# Patient Record
Sex: MALE
[De-identification: names, ages, dates, MRNs, and addresses within clinical notes are randomized per-mention and may not be internally consistent; named-entity substitution may affect disease eponyms.]

---

## 2019-04-18 ENCOUNTER — RX ONLY (OUTPATIENT)
Age: 54
Setting detail: RX ONLY
End: 2019-04-18

## 2020-02-18 ENCOUNTER — RX ONLY (OUTPATIENT)
Age: 55
Setting detail: RX ONLY
End: 2020-02-18

## 2020-07-21 ENCOUNTER — RX ONLY (OUTPATIENT)
Age: 55
Setting detail: RX ONLY
End: 2020-07-21

## 2020-07-31 ENCOUNTER — RX ONLY (OUTPATIENT)
Age: 55
Setting detail: RX ONLY
End: 2020-07-31

## 2020-09-02 ENCOUNTER — APPOINTMENT (RX ONLY)
Dept: URBAN - METROPOLITAN AREA CLINIC 158 | Facility: CLINIC | Age: 55
Setting detail: DERMATOLOGY
End: 2020-09-02

## 2020-09-02 DIAGNOSIS — L20.89 OTHER ATOPIC DERMATITIS: ICD-10-CM | Status: STABLE

## 2020-09-02 DIAGNOSIS — L28.1 PRURIGO NODULARIS: ICD-10-CM | Status: STABLE

## 2020-09-02 DIAGNOSIS — L91.8 OTHER HYPERTROPHIC DISORDERS OF THE SKIN: ICD-10-CM

## 2020-09-02 PROBLEM — H54.415A BLINDNESS RIGHT EYE CATEGORY 5, NORMAL VISION LEFT EYE: Status: ACTIVE | Noted: 2020-09-02

## 2020-09-02 PROBLEM — N40.1 BENIGN PROSTATIC HYPERPLASIA WITH LOWER URINARY TRACT SYMPTOMS: Status: ACTIVE | Noted: 2020-09-02

## 2020-09-02 PROBLEM — I67.1 CEREBRAL ANEURYSM, NONRUPTURED: Status: ACTIVE | Noted: 2020-09-02

## 2020-09-02 PROBLEM — Z00.01 ENCOUNTER FOR GENERAL ADULT MEDICAL EXAMINATION WITH ABNORMAL FINDINGS: Status: ACTIVE | Noted: 2020-09-02

## 2020-09-02 PROBLEM — Z91.19 PATIENT'S NONCOMPLIANCE WITH OTHER MEDICAL TREATMENT AND REGIMEN: Status: ACTIVE | Noted: 2020-09-02

## 2020-09-02 PROBLEM — Z28.21 IMMUNIZATION NOT CARRIED OUT BECAUSE OF PATIENT REFUSAL: Status: ACTIVE | Noted: 2020-09-02

## 2020-09-02 PROBLEM — Z00.00 ENCOUNTER FOR GENERAL ADULT MEDICAL EXAMINATION WITHOUT ABNORMAL FINDINGS: Status: ACTIVE | Noted: 2020-09-02

## 2020-09-02 PROBLEM — D53.9 NUTRITIONAL ANEMIA, UNSPECIFIED: Status: ACTIVE | Noted: 2020-09-02

## 2020-09-02 PROBLEM — H50.9 UNSPECIFIED STRABISMUS: Status: ACTIVE | Noted: 2020-09-02

## 2020-09-02 PROBLEM — E55.9 VITAMIN D DEFICIENCY, UNSPECIFIED: Status: ACTIVE | Noted: 2020-09-02

## 2020-09-02 PROBLEM — R35.1 NOCTURIA: Status: ACTIVE | Noted: 2020-09-02

## 2020-09-02 PROBLEM — I12.9 HYPERTENSIVE CHRONIC KIDNEY DISEASE WITH STAGE 1 THROUGH STAGE 4 CHRONIC KIDNEY DISEASE, OR UNSPECIFIED CHRONIC KIDNEY DISEASE: Status: ACTIVE | Noted: 2020-09-02

## 2020-09-02 PROBLEM — M13.0 POLYARTHRITIS, UNSPECIFIED: Status: ACTIVE | Noted: 2020-09-02

## 2020-09-02 PROBLEM — E34.9 ENDOCRINE DISORDER, UNSPECIFIED: Status: ACTIVE | Noted: 2020-09-02

## 2020-09-02 PROBLEM — E66.01 MORBID (SEVERE) OBESITY DUE TO EXCESS CALORIES: Status: ACTIVE | Noted: 2020-09-02

## 2020-09-02 PROBLEM — K58.0 IRRITABLE BOWEL SYNDROME WITH DIARRHEA: Status: ACTIVE | Noted: 2020-09-02

## 2020-09-02 PROBLEM — F43.20 ADJUSTMENT DISORDER, UNSPECIFIED: Status: ACTIVE | Noted: 2020-09-02

## 2020-09-02 PROBLEM — K63.5 POLYP OF COLON: Status: ACTIVE | Noted: 2020-09-02

## 2020-09-02 PROBLEM — N18.3 CHRONIC KIDNEY DISEASE, STAGE 3 (MODERATE): Status: ACTIVE | Noted: 2020-09-02

## 2020-09-02 PROBLEM — N52.9 MALE ERECTILE DYSFUNCTION, UNSPECIFIED: Status: ACTIVE | Noted: 2020-09-02

## 2020-09-02 PROBLEM — M25.561 PAIN IN RIGHT KNEE: Status: ACTIVE | Noted: 2020-09-02

## 2020-09-02 PROBLEM — E79.0 HYPERURICEMIA WITHOUT SIGNS OF INFLAMMATORY ARTHRITIS AND TOPHACEOUS DISEASE: Status: ACTIVE | Noted: 2020-09-02

## 2020-09-02 PROBLEM — E78.00 PURE HYPERCHOLESTEROLEMIA, UNSPECIFIED: Status: ACTIVE | Noted: 2020-09-02

## 2020-09-02 PROBLEM — E53.8 DEFICIENCY OF OTHER SPECIFIED B GROUP VITAMINS: Status: ACTIVE | Noted: 2020-09-02

## 2020-09-02 PROBLEM — R25.2 CRAMP AND SPASM: Status: ACTIVE | Noted: 2020-09-02

## 2020-09-02 PROBLEM — B35.4 TINEA CORPORIS: Status: ACTIVE | Noted: 2020-09-02

## 2020-09-02 PROBLEM — R53.81 OTHER MALAISE: Status: ACTIVE | Noted: 2020-09-02

## 2020-09-02 PROBLEM — L20.84 INTRINSIC (ALLERGIC) ECZEMA: Status: ACTIVE | Noted: 2020-09-02

## 2020-09-02 PROBLEM — E78.2 MIXED HYPERLIPIDEMIA: Status: ACTIVE | Noted: 2020-09-02

## 2020-09-02 PROBLEM — H34.11 CENTRAL RETINAL ARTERY OCCLUSION, RIGHT EYE: Status: ACTIVE | Noted: 2020-09-02

## 2020-09-02 PROCEDURE — ? PRESCRIPTION

## 2020-09-02 PROCEDURE — ? SKIN TAG REMOVAL

## 2020-09-02 PROCEDURE — 11200 RMVL SKIN TAGS UP TO&INC 15: CPT

## 2020-09-02 PROCEDURE — ? TREATMENT REGIMEN

## 2020-09-02 PROCEDURE — ? COUNSELING

## 2020-09-02 PROCEDURE — 99243 OFF/OP CNSLTJ NEW/EST LOW 30: CPT | Mod: 25

## 2020-09-02 RX ORDER — LEVOCETIRIZINE DIHYDROCHLORIDE 5 MG
1 TABLET ORAL BID
Qty: 60 | Refills: 5 | Status: ERX | COMMUNITY
Start: 2020-09-02

## 2020-09-02 RX ORDER — BETAMETHASONE DIPROPIONATE 0.5 MG/G
APPLY CREAM, AUGMENTED TOPICAL BID
Qty: 50 | Refills: 11 | Status: ERX | COMMUNITY
Start: 2020-09-02

## 2020-09-02 RX ADMIN — BETAMETHASONE DIPROPIONATE APPLY: 0.5 CREAM, AUGMENTED TOPICAL at 00:00

## 2020-09-02 RX ADMIN — Medication 1: at 00:00

## 2020-09-02 ASSESSMENT — LOCATION DETAILED DESCRIPTION DERM
LOCATION DETAILED: RIGHT ANTERIOR PROXIMAL THIGH
LOCATION DETAILED: LEFT ANTERIOR PROXIMAL THIGH
LOCATION DETAILED: LEFT RIB CAGE
LOCATION DETAILED: RIGHT INFERIOR UPPER BACK
LOCATION DETAILED: RIGHT INFERIOR MEDIAL MIDBACK
LOCATION DETAILED: EPIGASTRIC SKIN

## 2020-09-02 ASSESSMENT — LOCATION SIMPLE DESCRIPTION DERM
LOCATION SIMPLE: RIGHT LOWER BACK
LOCATION SIMPLE: LEFT THIGH
LOCATION SIMPLE: RIGHT THIGH
LOCATION SIMPLE: ABDOMEN
LOCATION SIMPLE: RIGHT UPPER BACK

## 2020-09-02 ASSESSMENT — LOCATION ZONE DERM
LOCATION ZONE: TRUNK
LOCATION ZONE: LEG

## 2020-09-02 NOTE — HPI: RASH
Is This A New Presentation, Or A Follow-Up?: Rash
How Severe Is Your Rash?: mild
What Type Of Note Output Would You Prefer (Optional)?: Standard Output
Is The Patient Presenting As Previously Scheduled?: Yes
Additional History: Pt states he had a rash that started on his back and spread to the front but it is now gone. He states he changed his bedding, soap, and detergent.

## 2020-09-02 NOTE — PROCEDURE: SKIN TAG REMOVAL
Include Z78.9 (Other Specified Conditions Influencing Health Status) As An Associated Diagnosis?: No
Medical Necessity Information: It is in your best interest to select a reason for this procedure from the list below. All of these items fulfill various CMS LCD requirements except the new and changing color options.
Anesthesia Volume In Cc: 3
Medical Necessity Clause: This procedure was medically necessary because the lesions that were treated were:
Consent: Written consent obtained and the risks of skin tag removal was reviewed with the patient including but not limited to bleeding, pigmentary change, infection, pain, and remote possibility of scarring.
Add Associated Diagnoses If Applicable When Selecting Medical Necessity: Yes
Detail Level: Detailed
Anesthesia Type: 1% lidocaine with epinephrine

## 2020-09-02 NOTE — PROCEDURE: TREATMENT REGIMEN
Plan: Pt does not have much of an active rash today. \\nSending in tx for future flares.\\nPt is currently on Lipitor.
Initiate Treatment: betamethasone, augmented 0.05 % topical cream BID
Initiate Treatment: Xyzal 5 mg tablet BID
Detail Level: Zone